# Patient Record
Sex: FEMALE | Race: BLACK OR AFRICAN AMERICAN | NOT HISPANIC OR LATINO | URBAN - METROPOLITAN AREA
[De-identification: names, ages, dates, MRNs, and addresses within clinical notes are randomized per-mention and may not be internally consistent; named-entity substitution may affect disease eponyms.]

---

## 2018-05-10 ENCOUNTER — EMERGENCY (EMERGENCY)
Facility: HOSPITAL | Age: 53
LOS: 1 days | Discharge: HOME | End: 2018-05-10
Attending: EMERGENCY MEDICINE | Admitting: EMERGENCY MEDICINE
Payer: COMMERCIAL

## 2018-05-10 VITALS
OXYGEN SATURATION: 100 % | SYSTOLIC BLOOD PRESSURE: 122 MMHG | HEART RATE: 69 BPM | RESPIRATION RATE: 17 BRPM | DIASTOLIC BLOOD PRESSURE: 71 MMHG | TEMPERATURE: 98 F

## 2018-05-10 VITALS
HEART RATE: 94 BPM | SYSTOLIC BLOOD PRESSURE: 144 MMHG | TEMPERATURE: 98 F | DIASTOLIC BLOOD PRESSURE: 66 MMHG | RESPIRATION RATE: 18 BRPM | OXYGEN SATURATION: 100 %

## 2018-05-10 PROCEDURE — 99284 EMERGENCY DEPT VISIT MOD MDM: CPT

## 2018-05-10 PROCEDURE — 93010 ELECTROCARDIOGRAM REPORT: CPT

## 2018-05-10 PROCEDURE — 71046 X-RAY EXAM CHEST 2 VIEWS: CPT | Mod: 26

## 2018-05-10 NOTE — ED PROVIDER NOTE - ATTENDING CONTRIBUTION TO CARE
Josh: 52 yo female with h/o chronic back pain on naproxen as needed c/o dizziness this morning. Denies room spinning sensation. Dizziness is not worse with positional changes. Currently asymptomatic. PT endorses one week of productive cough and worsening allergic rhinitis but denies fever and chills. No recent travel or known sick contacts. No chest pain, SOB, headache ot visual changes. Pt endorses that she has felt this way many times in the past several yuears. No associated abominal pain, nausea and vomiting. Exam: well appearing, NAD, MMM, no scleral icterus, pink conjunctiva. +S1/S2, lungs CTA b/l, abdomen is soft and nontender. NO LE edema or calf TTP. A/P- 52 yo female with cough and nasal congestion and associated dizziness this morning which has now resolved. will obtain ekg and cxr. Josh: 54 yo female with h/o chronic back pain on naproxen as needed c/o dizziness this morning. Denies room spinning sensation. Dizziness is not worse with positional changes. Currently asymptomatic. PT endorses one week of productive cough and worsening allergic rhinitis but denies fever and chills. No recent travel or known sick contacts. No chest pain, SOB, headache ot visual changes. Pt endorses that she has felt this way many times in the past several years. No associated abdominal pain, nausea and vomiting. Exam: well appearing, NAD, MMM, no scleral icterus, pink conjunctiva. +S1/S2, lungs CTA b/l, abdomen is soft and nontender. NO LE edema or calf TTP. A/P- 54 yo female with cough and nasal congestion and associated dizziness this morning which has now resolved. will obtain ekg and cxr.

## 2018-05-10 NOTE — ED PROVIDER NOTE - OBJECTIVE STATEMENT
52yo F with hx of chronic back pain presents with dizziness that started this morning. did not drink any water yesterday. 54yo F with hx of chronic back pain presents with dizziness that started this morning. did not drink any water yesterday. had bit of coffee today, was on way to [facility] and became dizzy on the cab and was brought to the ER. no fevers, chills, recent illnesses. no hx of cardiac disease.

## 2018-05-10 NOTE — ED PROVIDER NOTE - MEDICAL DECISION MAKING DETAILS
MDM: possible presyncope. will get EKG/CXR for cardiac etiology, unlikely. likely vasovagal or dehydration. will PO fluids, home.

## 2021-02-25 NOTE — ED PROVIDER NOTE - NEURO NEGATIVE STATEMENT, MLM
no loss of consciousness, no gait abnormality, no headache, no sensory deficits, and no weakness. 125